# Patient Record
Sex: FEMALE | Race: WHITE
[De-identification: names, ages, dates, MRNs, and addresses within clinical notes are randomized per-mention and may not be internally consistent; named-entity substitution may affect disease eponyms.]

---

## 2017-06-25 ENCOUNTER — HOSPITAL ENCOUNTER (EMERGENCY)
Dept: HOSPITAL 80 - FED | Age: 62
Discharge: HOME | End: 2017-06-25
Payer: MEDICAID

## 2017-06-25 VITALS
HEART RATE: 88 BPM | RESPIRATION RATE: 16 BRPM | OXYGEN SATURATION: 95 % | DIASTOLIC BLOOD PRESSURE: 87 MMHG | TEMPERATURE: 96.8 F | SYSTOLIC BLOOD PRESSURE: 134 MMHG

## 2017-06-25 DIAGNOSIS — J20.9: Primary | ICD-10-CM

## 2017-06-25 NOTE — EDPHY
H & P


Stated Complaint: cough


Time Seen by Provider: 06/25/17 09:39


HPI/ROS: 





Chief Complaint:  Cough, shortness of breath





HPI:  62-year-old woman with 3 days of upper respiratory sites of symptoms with 

cough occasionally productive of yellow phlegm.  This morning she developed 

worsening shortness of breath with wheezing.  No fevers or chills. No nausea or 

vomiting. Has a history of reactive airway disease with allergies to animals in 

the past but has not take regular asthma medication.  No chest pain. No recent 

travel or periods of immobility.  No calf pain or swelling.





ROS:  10 point Review of Systems is negative except as noted in the HPI.





PMH:  None


Medications:  None


Allergies:  No known drug allergies





Social History: No smoking, occasional alcohol,  no recreational drug use





Family History: non-contributory





Physical Exam:


Gen: Awake, Alert, No Distress


HEENT:  


     Nose: no rhinorrhea


     Eyes: PERRLA, EOMI


     Mouth: Moist mucosa 


Neck: Supple, no JVD


Chest: nontender, diffuse expiratory wheezing with bibasilar crackles, 

decreased air movement


Heart: S1, S2 normal, no murmur


Abd: Soft, non-tender, no guarding


Back: no CVA tenderness, no midline tenderness 


Ext: no edema, non-tender


Skin: no rash


Neuro: CN II-XII intact, Sensation grossly intact, Strength 5/5 in bilateral 

upper and lower extremities








- Personal History


Current Tetanus/Diphtheria Vaccine: Unsure


Tetanus Vaccine Date: <10 years





- Medical/Surgical History


Hx Asthma: No


Hx Chronic Respiratory Disease: No


Hx Diabetes: No


Hx Cardiac Disease: No


Hx Renal Disease: No


Hx Cirrhosis: No


Hx Alcoholism: No


Hx HIV/AIDS: No


Hx Splenectomy or Spleen Trauma: No


Other PMH: appy





- Social History


Smoking Status: Never smoked


Constitutional: 


 Initial Vital Signs











Temperature (C)  36.5 C   06/25/17 09:24


 


Heart Rate  90   06/25/17 09:24


 


Respiratory Rate  24 H  06/25/17 09:24


 


Blood Pressure  122/73 H  06/25/17 09:24


 


O2 Sat (%)  92   06/25/17 09:24








 











O2 Delivery Mode               Room Air














Allergies/Adverse Reactions: 


 





codeine Allergy (Verified 06/25/17 09:22)


 








Home Medications: 














 Medication  Instructions  Recorded


 


Albuterol [Proventil Inhaler HFA 1 - 2 puffs IH Q4H PRN #1 mdi 06/25/17





(*)]  


 


Inhaler, Assist Devices [Space 1 each MC Q4H PRN #1 spacer 06/25/17





Chamber Plus]  


 


predniSONE 60 mg PO DAILY #9 tab 06/25/17














Medical Decision Making





- Diagnostics


Imaging Results: 


 Imaging Impressions





Chest X-Ray  06/25/17 09:46


Impression: Lung hyperexpansion with mild perihilar bronchial wall thickening 

raises the possibility of underlying reactive airways' disease. There is no 

focal infiltrate.











ED Course/Re-evaluation: 





Chest x-ray is negative for acute infection.  Findings consistent with 

bronchitis.  Patient is improved after albuterol and Atrovent neb.  Lungs are 

now clear.  Symptoms consistent with acute bronchitis.  Will discharge with 

albuterol MDI with spacer.  Also given a prescription for prednisone.  She is 

refusing to take any at this time.  She will follow up with primary care 

physician.





- Data Points


Medications Given: 


 








Discontinued Medications





Albuterol/Ipratropium (Duoneb)  3 ml IH EDNOW ONE


   Stop: 06/25/17 11:15


   Last Admin: 06/25/17 11:19 Dose:  3 ml








Departure





- Departure


Disposition: Home, Routine, Self-Care


Clinical Impression: 


 Acute bronchitis





Condition: Good


Instructions:  Acute Bronchitis (ED), Wheezing (ED)


Additional Instructions: 


May use your inhaler 1-2 puffs every 4 hours as needed for wheezing.


Always use a spacer using your inhaler.


Follow up with your primary care physician in 3-4 days if symptoms are not 

improving.


Referrals: 


Luz Elena Castillo MD [Primary Care Provider] - As per Instructions


Prescriptions: 


Albuterol [Proventil Inhaler HFA (*)] 1 - 2 puffs IH Q4H PRN #1 mdi


 PRN Reason: Wheezing


Inhaler, Assist Devices [Space Chamber Plus] 1 each MC Q4H PRN #1 spacer


 PRN Reason: Wheezing


predniSONE 60 mg PO DAILY #9 tab

## 2017-12-05 ENCOUNTER — HOSPITAL ENCOUNTER (OUTPATIENT)
Dept: HOSPITAL 80 - BRMIMAGING | Age: 62
End: 2017-12-05
Attending: ADVANCED PRACTICE MIDWIFE
Payer: MEDICAID

## 2017-12-05 DIAGNOSIS — Z13.820: Primary | ICD-10-CM

## 2017-12-05 DIAGNOSIS — M81.0: ICD-10-CM

## 2018-09-15 ENCOUNTER — HOSPITAL ENCOUNTER (EMERGENCY)
Dept: HOSPITAL 80 - FED | Age: 63
Discharge: HOME | End: 2018-09-15
Payer: MEDICAID

## 2018-09-15 VITALS — DIASTOLIC BLOOD PRESSURE: 57 MMHG | SYSTOLIC BLOOD PRESSURE: 93 MMHG

## 2018-09-15 DIAGNOSIS — S61.213A: Primary | ICD-10-CM

## 2022-06-16 NOTE — EDPHY
H & P


Time Seen by Provider: 09/15/18 10:01


HPI/ROS: 





CHIEF COMPLAINT:  Finger pain





HISTORY OF PRESENT ILLNESS:  Patient is a 63-year-old female here with finger 

pain after she cut her finger with gardening haley yesterday.  She was seen by 

her primary care physician ordered an x-ray which was done but not read 

yesterday.  She was told to follow up if she noticed any increased inflammation 

to the area.  States she noticed increased swelling to the area so followed up 

here.  Her tetanus is up-to-date.  She is nose no fever or chills.  She has no 

loss of range of motion of the finger.





REVIEW OF SYSTEMS:


Constitutional:  No fever, no chills.


Eyes:  No discharge.


ENT:  No sore throat.


Cardiovascular:  No chest pain, no palpitations.


Respiratory:  No cough, no shortness of breath.


Gastrointestinal:  No abdominal pain, no vomiting.


Genitourinary:  No hematuria.


Musculoskeletal:  No back pain.


Skin:  No rashes.


Neurological:  No headache


Smoking Status: Never smoked


Physical Exam: 





General Appearance:  Alert and no distress.


Eyes:  Pupils equal and round no injection.


Respiratory:  Chest is nontender, lungs are clear to auscultation.


Cardiac:  regular rate and rhythm.


Gastrointestinal:  Abdomen is soft and nontender, no masses, bowel sounds 

normal.


Musculoskeletal:  Neck is supple and nontender.


Extremities have full range of motion and are nontender.


Skin:  No rashes or lesions.





Constitutional: 


 Initial Vital Signs











Temperature (C)  36.6 C   09/15/18 09:56


 


Heart Rate  82   09/15/18 09:56


 


Respiratory Rate  18   09/15/18 09:56


 


Blood Pressure  93/57 L  09/15/18 09:56


 


O2 Sat (%)  97   09/15/18 09:56








 











O2 Delivery Mode               Room Air














Allergies/Adverse Reactions: 


 





codeine Allergy (Verified 09/15/18 09:55)


 








Home Medications: 














 Medication  Instructions  Recorded


 


Albuterol [Proventil Inhaler HFA 1 - 2 puffs IH Q4H PRN #1 mdi 06/25/17





(*)]  


 


Inhaler, Assist Devices [Space 1 each MC Q4H PRN #1 spacer 06/25/17





Chamber Plus]  


 


Cephalexin [Keflex (*)] 500 mg PO QID 7 Days #28 cap 09/15/18














Medical Decision Making


ED Course/Re-evaluation: 


Patient here with laceration to the left finger 1 day ago.  There is mild 

erythema surrounding the superficial laceration.  There is no underlying 

fracture seen on x-ray.  She is afebrile not tachycardic.  I have low suspicion 

for cellulitis that she does have erythema so was prescribed Keflex which I 

suggest she start in 24 hr if inflammation does not resolve with Motrin and ice.


Differential Diagnosis: 





Fracture, dislocation, foreign body, cellulitis





Departure





- Departure


Disposition: Home, Routine, Self-Care


Clinical Impression: 


 Finger laceration





Condition: Good


Instructions:  Finger Laceration (ED)


Additional Instructions: 


There is no evidence of broken bone on her x-ray.  A prescribing an antibiotic 

which he may start in 24 hr if the redness does not resolve with ice and 

ibuprofen.  He developed fever, worsening swelling or other worrisome symptoms 

please return to the ER.


Referrals: 


Luz Elena Castillo MD [Primary Care Provider] - As per Instructions


Prescriptions: 


Cephalexin [Keflex (*)] 500 mg PO QID 7 Days #28 cap Unable to assess due to patient's cognitive impairment